# Patient Record
Sex: MALE | Race: BLACK OR AFRICAN AMERICAN | NOT HISPANIC OR LATINO | URBAN - METROPOLITAN AREA
[De-identification: names, ages, dates, MRNs, and addresses within clinical notes are randomized per-mention and may not be internally consistent; named-entity substitution may affect disease eponyms.]

---
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
## 2023-04-07 ENCOUNTER — OFFICE VISIT (OUTPATIENT)
Dept: URGENT CARE | Facility: CLINIC | Age: 18
End: 2023-04-07
Payer: COMMERCIAL

## 2023-04-07 VITALS
BODY MASS INDEX: 27.68 KG/M2 | HEART RATE: 85 BPM | WEIGHT: 197.69 LBS | DIASTOLIC BLOOD PRESSURE: 74 MMHG | OXYGEN SATURATION: 99 % | TEMPERATURE: 98 F | SYSTOLIC BLOOD PRESSURE: 110 MMHG | HEIGHT: 71 IN | RESPIRATION RATE: 16 BRPM

## 2023-04-07 DIAGNOSIS — U07.1 COVID-19: ICD-10-CM

## 2023-04-07 DIAGNOSIS — Z11.52 ENCOUNTER FOR SCREENING FOR COVID-19: Primary | ICD-10-CM

## 2023-04-07 DIAGNOSIS — R68.89 FLU-LIKE SYMPTOMS: ICD-10-CM

## 2023-04-07 LAB
CTP QC/QA: YES
CTP QC/QA: YES
FLUAV AG NPH QL: NEGATIVE
FLUBV AG NPH QL: NEGATIVE
SARS-COV-2 RDRP RESP QL NAA+PROBE: POSITIVE

## 2023-04-07 PROCEDURE — 99214 OFFICE O/P EST MOD 30 MIN: CPT | Mod: S$PBB,,, | Performed by: FAMILY MEDICINE

## 2023-04-07 PROCEDURE — 99214 OFFICE O/P EST MOD 30 MIN: CPT | Mod: PBBFAC | Performed by: FAMILY MEDICINE

## 2023-04-07 PROCEDURE — 99214 PR OFFICE/OUTPT VISIT, EST, LEVL IV, 30-39 MIN: ICD-10-PCS | Mod: S$PBB,,, | Performed by: FAMILY MEDICINE

## 2023-04-07 PROCEDURE — 87635 SARS-COV-2 COVID-19 AMP PRB: CPT | Mod: PBBFAC | Performed by: FAMILY MEDICINE

## 2023-04-07 PROCEDURE — 87804 INFLUENZA ASSAY W/OPTIC: CPT | Mod: PBBFAC | Performed by: FAMILY MEDICINE

## 2023-04-07 RX ORDER — PREDNISONE 20 MG/1
20 TABLET ORAL DAILY
Qty: 5 TABLET | Refills: 0 | Status: SHIPPED | OUTPATIENT
Start: 2023-04-07 | End: 2023-04-12

## 2023-04-07 NOTE — LETTER
April 7, 2023      [facility]  [address] [address]  Phone: [phone]       Patient: Blake Reed   YOB: 2005  Date of Visit: 04/07/2023    To Whom It May Concern:    Mylene Reed  was at [facility] on 04/07/2023. The patient may return to work/school on APR 12 2023 with no restrictions. If you have any questions or concerns, or if I can be of further assistance, please do not hesitate to contact me.    Sincerely,    LINDSAY MYERS MD

## 2023-04-07 NOTE — LETTER
April 7, 2023      [facility]  [address] [address]  Phone: [phone]       Patient: Blake Reed   YOB: 2005  Date of Visit: 04/07/2023    To Whom It May Concern:    Mylene Reed  was at [facility] on 04/07/2023. The patient may return to work/school on APR 11 2023 with no restrictions. If you have any questions or concerns, or if I can be of further assistance, please do not hesitate to contact me.    Sincerely,    LINDSAY MYERS MD

## 2023-04-07 NOTE — PROGRESS NOTES
"Subjective:       Patient ID: Blake Reed is a 18 y.o. male.    Chief Complaint: Sinus Problem (Congestion, runny nose, HA since yesterday. Denies sore throat.  Exposed to COVID.)      HPI  18-year-old male with sinus congestion, runny nose, and headache since yesterday.  Denies a sore throat.  Exposed to COVID earlier this week.  Over-the-counter medications minimally effective.  Review of Systems   Constitutional:         As above   HENT:          As above       Objective:       Vital Signs  Temp: 98.2 °F (36.8 °C)  Pulse: 85  Resp: 16  SpO2: 99 %  BP: 110/74  Height and Weight  Height: 5' 11.46" (181.5 cm)  Weight: 89.7 kg (197 lb 11.2 oz)  BSA (Calculated - sq m): 2.13 sq meters  BMI (Calculated): 27.2  Weight in (lb) to have BMI = 25: 181.2]  Physical Exam  Vitals reviewed.   Constitutional:       Appearance: Normal appearance.   HENT:      Head: Normocephalic and atraumatic.   Eyes:      Extraocular Movements: Extraocular movements intact.      Conjunctiva/sclera: Conjunctivae normal.   Cardiovascular:      Rate and Rhythm: Normal rate and regular rhythm.      Heart sounds: Normal heart sounds.   Pulmonary:      Breath sounds: Normal breath sounds.   Musculoskeletal:      Cervical back: No tenderness.   Lymphadenopathy:      Cervical: No cervical adenopathy.   Skin:     General: Skin is dry.   Neurological:      General: No focal deficit present.      Mental Status: He is alert.   Psychiatric:         Mood and Affect: Mood normal.         Behavior: Behavior normal.       Assessment:       Problem List Items Addressed This Visit    None  Visit Diagnoses       Encounter for screening for COVID-19    -  Primary    Relevant Orders    POCT COVID-19 Rapid Screening (Completed)    Flu-like symptoms        Relevant Orders    POCT COVID-19 Rapid Screening (Completed)    POCT Influenza A/B (Completed)    COVID-19        Relevant Medications    predniSONE (DELTASONE) 20 MG tablet            Plan:    Encouraged " antihistamines as needed  Encouraged ibuprofen/acetaminophen as needed  ER precautions  Follow-up with PCP

## 2023-12-23 ENCOUNTER — HOSPITAL ENCOUNTER (EMERGENCY)
Facility: HOSPITAL | Age: 18
Discharge: HOME | End: 2023-12-24
Attending: EMERGENCY MEDICINE
Payer: COMMERCIAL

## 2023-12-23 VITALS
RESPIRATION RATE: 16 BRPM | SYSTOLIC BLOOD PRESSURE: 135 MMHG | DIASTOLIC BLOOD PRESSURE: 82 MMHG | TEMPERATURE: 98 F | HEART RATE: 72 BPM | OXYGEN SATURATION: 99 %

## 2023-12-23 DIAGNOSIS — J02.9 VIRAL PHARYNGITIS: Primary | ICD-10-CM

## 2023-12-23 LAB
INFLUENZA A, MOLECULAR: NEGATIVE
INFLUENZA B, MOLECULAR: NEGATIVE
SARS-COV-2 RDRP RESP QL NAA+PROBE: NEGATIVE
SPECIMEN SOURCE: NORMAL

## 2023-12-23 PROCEDURE — 99282 EMERGENCY DEPT VISIT SF MDM: CPT

## 2023-12-23 PROCEDURE — 87502 INFLUENZA DNA AMP PROBE: CPT | Performed by: EMERGENCY MEDICINE

## 2023-12-23 PROCEDURE — U0002 COVID-19 LAB TEST NON-CDC: HCPCS | Performed by: EMERGENCY MEDICINE

## 2023-12-24 NOTE — ED TRIAGE NOTES
Chief Complaint   Patient presents with    Sore Throat     Pt c/o sore throat x 1 day     APPEARANCE: No acute distress.    NEURO: Awake, alert, appropriate for age  HEENT: Head symmetrical. No obvious deformity  RESPIRATORY: Airway is open and patent. Respirations are spontaneous on room air.   NEUROVASCULAR: All extremities are warm and pink with capillary refill less than 3 seconds.   MUSCULOSKELETAL: Moves all extremities, wiggling toes and moving hands.   SKIN: Warm and dry, adequate turgor, mucus membranes moist and pink    Will continue to monitor.

## 2023-12-24 NOTE — DISCHARGE INSTRUCTIONS
Your COVID and flu tests are negative.  Your exam does not appear to be consistent with strep throat.  Continue Tylenol/ibuprofen as needed for symptoms.

## 2023-12-24 NOTE — ED PROVIDER NOTES
Encounter Date: 12/23/2023       History     Chief Complaint   Patient presents with    Sore Throat     Pt c/o sore throat x 1 day     Blake Reed is a 18-year-old male no significant past medical history presenting today with a sore throat, nasal congestion that has been persistent x2 days.  Unknown sick contacts.  Denies fevers or chills.  No cough.  No abdominal pain.  He is here because he is concerned he has flu or COVID.      Review of patient's allergies indicates:  No Known Allergies  History reviewed. No pertinent past medical history.  History reviewed. No pertinent surgical history.  History reviewed. No pertinent family history.  Social History     Tobacco Use    Smoking status: Never    Smokeless tobacco: Never   Substance Use Topics    Alcohol use: Not Currently    Drug use: Never     Review of Systems    Physical Exam     Initial Vitals [12/23/23 2109]   BP Pulse Resp Temp SpO2   139/81 70 16 98 °F (36.7 °C) 99 %      MAP       --         Physical Exam    Nursing note and vitals reviewed.  Constitutional: He appears well-developed and well-nourished. No distress.   HENT:   + minimal bilateral posterior oral pharyngeal erythema without exudates.  No tonsillar enlargement.  Uvula midline.   Eyes: Conjunctivae are normal.   Neck: Neck supple.   Cardiovascular:  Normal rate, regular rhythm and intact distal pulses.           Pulmonary/Chest: Breath sounds normal. He has no wheezes. He has no rales.   Abdominal: Abdomen is soft. Bowel sounds are normal. There is no abdominal tenderness.   Musculoskeletal:         General: No edema.      Cervical back: Neck supple.     Lymphadenopathy:     He has no cervical adenopathy.   Neurological: He is alert and oriented to person, place, and time.   Skin: No rash noted.   Psychiatric: He has a normal mood and affect.         ED Course   Procedures  Labs Reviewed   INFLUENZA A & B BY MOLECULAR   SARS-COV-2 RNA AMPLIFICATION, QUAL          Imaging Results    None           Medications - No data to display  Medical Decision Making  Emergent evaluation of 18-year-old male presenting today with nasal congestion and sore throat x2 days.    Vital signs stable.  Well-appearing, no acute distress.    Differential Includes but not limited to viral pharyngitis, strep throat, COVID, flu, viral syndrome    Based on today's exam, low suspicion for strep pharyngitis.  Will swab for COVID and flu.      Amount and/or Complexity of Data Reviewed  Labs:  Decision-making details documented in ED Course.               ED Course as of 12/24/23 0150   Sat Dec 23, 2023   2316 Influenza A, Molecular: Negative [GM]   2316 Influenza B, Molecular: Negative [GM]   2321 SARS-CoV-2 RNA, Amplification, Qual: Negative [GM]   2321 COVID and flu testing negative.  Patient likely has a viral syndrome.  Recommend Tylenol/ibuprofen as needed.  Return to emergency department for any concerning symptom otherwise follow up with primary doctor [GM]      ED Course User Index  [GM] Aileen Castillo MD                           Clinical Impression:  Final diagnoses:  [J02.9] Viral pharyngitis (Primary)          ED Disposition Condition    Discharge Stable          ED Prescriptions    None       Follow-up Information       Follow up With Specialties Details Why Contact Info    [facility] Emergency Medicine  If symptoms worsen [address]  [facility] [address] [phone]             Aileen Castillo MD  12/24/23 0150